# Patient Record
Sex: FEMALE | Race: WHITE | NOT HISPANIC OR LATINO | URBAN - METROPOLITAN AREA
[De-identification: names, ages, dates, MRNs, and addresses within clinical notes are randomized per-mention and may not be internally consistent; named-entity substitution may affect disease eponyms.]

---

## 2023-08-29 ENCOUNTER — OFFICE (OUTPATIENT)
Dept: URBAN - METROPOLITAN AREA CLINIC 19 | Facility: CLINIC | Age: 78
End: 2023-08-29

## 2023-08-29 VITALS
WEIGHT: 144 LBS | OXYGEN SATURATION: 97 % | HEIGHT: 63 IN | DIASTOLIC BLOOD PRESSURE: 103 MMHG | SYSTOLIC BLOOD PRESSURE: 184 MMHG

## 2023-08-29 DIAGNOSIS — R10.12 LEFT UPPER QUADRANT PAIN: ICD-10-CM

## 2023-08-29 DIAGNOSIS — R93.3 ABNORMAL FINDINGS ON DIAGNOSTIC IMAGING OF OTHER PARTS OF DI: ICD-10-CM

## 2023-08-29 DIAGNOSIS — Z87.11 PERSONAL HISTORY OF PEPTIC ULCER DISEASE: ICD-10-CM

## 2023-08-29 DIAGNOSIS — K44.9 DIAPHRAGMATIC HERNIA WITHOUT OBSTRUCTION OR GANGRENE: ICD-10-CM

## 2023-08-29 DIAGNOSIS — I35.0 NONRHEUMATIC AORTIC (VALVE) STENOSIS: ICD-10-CM

## 2023-08-29 DIAGNOSIS — Z80.0 FAMILY HISTORY OF MALIGNANT NEOPLASM OF DIGESTIVE ORGANS: ICD-10-CM

## 2023-08-29 PROCEDURE — 99203 OFFICE O/P NEW LOW 30 MIN: CPT | Performed by: INTERNAL MEDICINE

## 2023-08-29 RX ORDER — POLYETHYLENE GLYCOL 3350, SODIUM SULFATE, SODIUM CHLORIDE, POTASSIUM CHLORIDE, ASCORBIC ACID, SODIUM ASCORBATE 140-9-5.2G
KIT ORAL
Qty: 1 | Refills: 0 | Status: ACTIVE
Start: 2023-08-29

## 2023-08-29 NOTE — SERVICEHPINOTES
78-year-old female reports that she was recently hospitalized at [facility] and had an echocardiogram as well as a barium test which was concerning for a large hiatal hernia.  Reports that she had an EGD as well as colonoscopy in the 1990s and was diagnosed with extensive peptic ulcer disease.  Denies any reflux symptoms.  Denies any nausea or vomiting.  Reports pressure-like discomfort in the left upper quadrant region.  Reports being diagnosed with IBS and bowel movements alternate between constipation and diarrhea.  Denies any hematemesis or melena or hematochezia.  Reports remote history of CVA for which she is on clopidogrel.  No history of myocardial infarction or cardiac stents.  Father with colon cancer in his 60s.  No family history of colon polyps.  Reports history of aortic stenosis.  Accompanied by son.  Reports significant back pain and she is unable to lay down flat on the stretcher.  Reports that she is able to lie down on the left side comfortably.  She is currently in a wheelchair.  Movement is limited due to left knee issues.

## 2023-08-30 LAB
AMYLASE: 49 U/L (ref 31–110)
LIPASE: 23 U/L (ref 14–85)